# Patient Record
Sex: MALE | Race: WHITE | NOT HISPANIC OR LATINO
[De-identification: names, ages, dates, MRNs, and addresses within clinical notes are randomized per-mention and may not be internally consistent; named-entity substitution may affect disease eponyms.]

---

## 2017-04-20 ENCOUNTER — RECORDS - HEALTHEAST (OUTPATIENT)
Dept: ADMINISTRATIVE | Facility: OTHER | Age: 57
End: 2017-04-20

## 2017-04-21 ENCOUNTER — RECORDS - HEALTHEAST (OUTPATIENT)
Dept: ADMINISTRATIVE | Facility: OTHER | Age: 57
End: 2017-04-21

## 2017-05-04 ENCOUNTER — RECORDS - HEALTHEAST (OUTPATIENT)
Dept: LAB | Facility: CLINIC | Age: 57
End: 2017-05-04

## 2017-05-04 LAB
CHOLEST SERPL-MCNC: 149 MG/DL
FASTING STATUS PATIENT QL REPORTED: NO
HDLC SERPL-MCNC: 31 MG/DL
LDLC SERPL CALC-MCNC: 101 MG/DL
TRIGL SERPL-MCNC: 85 MG/DL

## 2018-12-21 ENCOUNTER — OFFICE VISIT - HEALTHEAST (OUTPATIENT)
Dept: FAMILY MEDICINE | Facility: CLINIC | Age: 58
End: 2018-12-21

## 2018-12-21 ENCOUNTER — COMMUNICATION - HEALTHEAST (OUTPATIENT)
Dept: TELEHEALTH | Facility: CLINIC | Age: 58
End: 2018-12-21

## 2018-12-21 DIAGNOSIS — Z76.89 ENCOUNTER TO ESTABLISH CARE: ICD-10-CM

## 2018-12-21 DIAGNOSIS — Z12.11 SCREEN FOR COLON CANCER: ICD-10-CM

## 2018-12-21 DIAGNOSIS — I10 BENIGN ESSENTIAL HYPERTENSION: ICD-10-CM

## 2019-06-19 ENCOUNTER — COMMUNICATION - HEALTHEAST (OUTPATIENT)
Dept: FAMILY MEDICINE | Facility: CLINIC | Age: 59
End: 2019-06-19

## 2019-06-19 DIAGNOSIS — I10 BENIGN ESSENTIAL HYPERTENSION: ICD-10-CM

## 2019-07-14 ENCOUNTER — COMMUNICATION - HEALTHEAST (OUTPATIENT)
Dept: FAMILY MEDICINE | Facility: CLINIC | Age: 59
End: 2019-07-14

## 2019-07-14 DIAGNOSIS — I10 BENIGN ESSENTIAL HYPERTENSION: ICD-10-CM

## 2019-07-22 ENCOUNTER — OFFICE VISIT - HEALTHEAST (OUTPATIENT)
Dept: FAMILY MEDICINE | Facility: CLINIC | Age: 59
End: 2019-07-22

## 2019-07-22 DIAGNOSIS — I10 BENIGN ESSENTIAL HYPERTENSION: ICD-10-CM

## 2020-07-28 ENCOUNTER — COMMUNICATION - HEALTHEAST (OUTPATIENT)
Dept: FAMILY MEDICINE | Facility: CLINIC | Age: 60
End: 2020-07-28

## 2020-07-28 DIAGNOSIS — I10 BENIGN ESSENTIAL HYPERTENSION: ICD-10-CM

## 2020-08-11 ENCOUNTER — COMMUNICATION - HEALTHEAST (OUTPATIENT)
Dept: FAMILY MEDICINE | Facility: CLINIC | Age: 60
End: 2020-08-11

## 2020-09-16 ENCOUNTER — COMMUNICATION - HEALTHEAST (OUTPATIENT)
Dept: FAMILY MEDICINE | Facility: CLINIC | Age: 60
End: 2020-09-16

## 2020-09-16 ENCOUNTER — OFFICE VISIT - HEALTHEAST (OUTPATIENT)
Dept: FAMILY MEDICINE | Facility: CLINIC | Age: 60
End: 2020-09-16

## 2020-09-16 DIAGNOSIS — E66.812 CLASS 2 OBESITY WITHOUT SERIOUS COMORBIDITY IN ADULT, UNSPECIFIED BMI, UNSPECIFIED OBESITY TYPE: ICD-10-CM

## 2020-09-16 DIAGNOSIS — I10 BENIGN ESSENTIAL HYPERTENSION: ICD-10-CM

## 2020-09-16 RX ORDER — HYDROCHLOROTHIAZIDE 25 MG/1
25 TABLET ORAL DAILY
Qty: 90 TABLET | Refills: 2 | Status: SHIPPED | OUTPATIENT
Start: 2020-09-16

## 2020-09-16 RX ORDER — LOSARTAN POTASSIUM 100 MG/1
100 TABLET ORAL DAILY
Qty: 90 TABLET | Refills: 2 | Status: SHIPPED | OUTPATIENT
Start: 2020-09-16

## 2020-09-16 ASSESSMENT — MIFFLIN-ST. JEOR: SCORE: 2048.23

## 2021-05-29 NOTE — TELEPHONE ENCOUNTER
Call placed to advise patient needs appointment. Was told by gentlemen that answered the phone it was the wrong number.    Call placed to pharmacy to request they have the patient contact the office.    Pastora Hendrix [facility] CMT

## 2021-05-29 NOTE — TELEPHONE ENCOUNTER
Left message to call back for: Melinda-voicemail not setup  Information to relay to patient:      Put in a 30-day refill of the medications which I had , into losartan as well as hydrochlorothiazide.  I think that he needs to be seen for follow-up.

## 2021-05-29 NOTE — TELEPHONE ENCOUNTER
Put in a 30-day refill of the medications which I had , into losartan as well as hydrochlorothiazide.  I think that he needs to be seen for follow-up.

## 2021-05-29 NOTE — TELEPHONE ENCOUNTER
Left message to call back for: Melinda-voicemail not setup-2nd attempt  Information to relay to patient:       Put in a 30-day refill of the medications which I had , into losartan as well as hydrochlorothiazide.  I think that he needs to be seen for follow-up.

## 2021-05-29 NOTE — TELEPHONE ENCOUNTER
Patient walked into clinic. States he has been stable on this medication for years. Declined to schedule appointment at this time. He is requesting a 90 day supply of the losartan and hctz to be sent to his pharmacy.    Pastora Hendrix, [facility] CMT

## 2021-05-30 NOTE — PROGRESS NOTES
ASSESSMENT:  1. Benign essential hypertension  - hydroCHLOROthiazide (HYDRODIURIL) 25 MG tablet; Take 1 tablet (25 mg total) by mouth daily. Please come in for follow up.  Dispense: 90 tablet; Refill: 2  - losartan (COZAAR) 100 MG tablet; Take 1 tablet (100 mg total) by mouth daily.  Dispense: 90 tablet; Refill: 2  - Basic Metabolic Panel; Future  The pressure looks stable and controlled at this time. Refilled the medication.OPrdered for lab to check his Kidney function.Want to do it when he has some money to. Informed of clinic payment scale if he needs.  Discussed follow up needs, will see him in 6 months or earlier as needed.    PLAN:  There are no Patient Instructions on file for this visit.    Orders Placed This Encounter   Procedures     Basic Metabolic Panel     Standing Status:   Future     Standing Expiration Date:   7/22/2020     Medications Discontinued During This Encounter   Medication Reason     losartan-hydrochlorothiazide (HYZAAR) 100-25 mg per tablet Alternate therapy     hydroCHLOROthiazide (HYDRODIURIL) 25 MG tablet Reorder     losartan (COZAAR) 100 MG tablet Reorder       Return in about 9 months (around 4/22/2020), or if symptoms worsen or fail to improve, for Recheck.      CHIEF COMPLAINT:  Chief Complaint   Patient presents with     Hypertension     Medication Refill     Medication Questions       HISTORY OF PRESENT ILLNESS:  Melinda is a 58 y.o. male presenting to the clinic today for follow-up of Hypertension. He is exercising but not scheduled but noted walking consistently and is adherent to low salt diet.  Blood pressure is well controlled at home. Cardiac symptoms none. Patient denies chest pain, dyspnea and fatigue.  Cardiovascular factors: hypertension and male gender. Use of agents associated with hypertension: none. History of target organ damage: none.    REVIEW OF SYSTEMS:   He denies any complaints. Feels well. Having problems with insurance coverage and has to pay out of  pocket.Looking for a job. All other systems are negative.    PFSH:  Reviewed, as below.    Social History     Tobacco Use   Smoking Status Never Smoker   Smokeless Tobacco Never Used   Tobacco Comment    Cigars rarely.       Family History   Problem Relation Age of Onset     Stroke Mother      Heart disease Father        Social History     Socioeconomic History     Marital status: Single     Spouse name: Not on file     Number of children: Not on file     Years of education: Not on file     Highest education level: Not on file   Occupational History     Not on file   Social Needs     Financial resource strain: Not on file     Food insecurity:     Worry: Not on file     Inability: Not on file     Transportation needs:     Medical: Not on file     Non-medical: Not on file   Tobacco Use     Smoking status: Never Smoker     Smokeless tobacco: Never Used     Tobacco comment: Cigars rarely.   Substance and Sexual Activity     Alcohol use: Yes     Frequency: Monthly or less     Comment: Very Little     Drug use: No     Sexual activity: Never   Lifestyle     Physical activity:     Days per week: Not on file     Minutes per session: Not on file     Stress: Not on file   Relationships     Social connections:     Talks on phone: Not on file     Gets together: Not on file     Attends Spiritism service: Not on file     Active member of club or organization: Not on file     Attends meetings of clubs or organizations: Not on file     Relationship status: Not on file     Intimate partner violence:     Fear of current or ex partner: Not on file     Emotionally abused: Not on file     Physically abused: Not on file     Forced sexual activity: Not on file   Other Topics Concern     Not on file   Social History Narrative     Not on file       Past Surgical History:   Procedure Laterality Date     KNEE ARTHROSCOPY Left      SINUSOTOMY         No Known Allergies    Active Ambulatory Problems     Diagnosis Date Noted     No Active  Ambulatory Problems     Resolved Ambulatory Problems     Diagnosis Date Noted     No Resolved Ambulatory Problems     Past Medical History:   Diagnosis Date     Hypertension        Current Outpatient Medications   Medication Sig Dispense Refill     hydroCHLOROthiazide (HYDRODIURIL) 25 MG tablet Take 1 tablet (25 mg total) by mouth daily. Please come in for follow up. 90 tablet 2     losartan (COZAAR) 100 MG tablet Take 1 tablet (100 mg total) by mouth daily. 90 tablet 2     No current facility-administered medications for this visit.        VITALS:  Vitals:    07/22/19 0837   BP: 132/86   Pulse: 77   SpO2: 95%   Weight: (!) 270 lb (122.5 kg)     Wt Readings from Last 3 Encounters:   07/22/19 (!) 270 lb (122.5 kg)   12/21/18 (!) 265 lb 4.8 oz (120.3 kg)     There is no height or weight on file to calculate BMI.    PHYSICAL EXAM:  General Appearance: Alert, cooperative, no distress, appears stated age  HEENT: Pupils are equal and reactive, extraocular motions is normal.  Oropharynx is moist.  Neck is supple no notable thyromegaly.  External ears are normal.  Lungs: Clear to auscultation bilaterally, respirations unlabored  Heart: Regular rate and rhythm, S1 and S2 normal, no murmur, rub, or gallop  Abdomen: Soft  Musculoskeletal: Normal range of motion. No joint swelling or deformity.   Neurologic:  Alert and oriented times 3. Cranial nerves II-XII intact.   Psychiatric: Normal mood and affect.    MEDICATIONS:  Current Outpatient Medications   Medication Sig Dispense Refill     hydroCHLOROthiazide (HYDRODIURIL) 25 MG tablet Take 1 tablet (25 mg total) by mouth daily. Please come in for follow up. 90 tablet 2     losartan (COZAAR) 100 MG tablet Take 1 tablet (100 mg total) by mouth daily. 90 tablet 2     No current facility-administered medications for this visit.

## 2021-05-30 NOTE — TELEPHONE ENCOUNTER
Unable to reach by phone. Letter sent to schedule appointment.  Pastora Hendrix [facility] CMT

## 2021-05-30 NOTE — TELEPHONE ENCOUNTER
Left message to call back for: Melinda-voicemail not setup-3rd attempt  Information to relay to patient:       Put in a 30-day refill of the medications which I had , into losartan as well as hydrochlorothiazide.  I think that he needs to be seen for follow-up.

## 2021-05-30 NOTE — TELEPHONE ENCOUNTER
RN cannot approve Refill Request    RN can NOT refill this medication Protocol failed and NO refill given. Last office visit: 12/21/2018 Aryan Cortes MD Last Physical: Visit date not found Last MTM visit: Visit date not found Last visit same specialty: 12/21/2018 Aryan Cortes MD.  Next visit within 3 mo: Visit date not found  Next physical within 3 mo: Visit date not found      Kylie John, [facility] Triage/Med Refill 7/14/2019    Requested Prescriptions   Pending Prescriptions Disp Refills     losartan (COZAAR) 100 MG tablet [Pharmacy Med Name: LOSARTAN POTASSIUM 100 MG TAB] 30 tablet 0     Sig: TAKE 1 TABLET BY MOUTH EVERY DAY       Angiotensin Receptor Blocker Protocol Failed - 7/14/2019  7:05 AM        Failed - Serum potassium within the past 12 months     No results found for: LN-POTASSIUM          Failed - Serum creatinine within the past 12 months     Creatinine   Date Value Ref Range Status   05/04/2017 0.82 0.70 - 1.30 mg/dL Final             Passed - PCP or prescribing provider visit in past 12 months       Last office visit with prescriber/PCP: 12/21/2018 Aryan Cortes MD OR same dept: 12/21/2018 Aryan Cortes MD OR same specialty: 12/21/2018 Aryan Cortes MD  Last physical: Visit date not found Last MTM visit: Visit date not found   Next visit within 3 mo: Visit date not found  Next physical within 3 mo: Visit date not found  Prescriber OR PCP: Aryan Cortes MD  Last diagnosis associated with med order: 1. Benign essential hypertension  - losartan (COZAAR) 100 MG tablet [Pharmacy Med Name: LOSARTAN POTASSIUM 100 MG TAB]; TAKE 1 TABLET BY MOUTH EVERY DAY  Dispense: 30 tablet; Refill: 0  - hydroCHLOROthiazide (HYDRODIURIL) 25 MG tablet [Pharmacy Med Name: HYDROCHLOROTHIAZIDE 25 MG TAB]; Take 1 tablet (25 mg total) by mouth daily. Please come in for follow up.  Dispense: 30 tablet; Refill: 0    If  protocol passes may refill for 12 months if within 3 months of last provider visit (or a total of 15 months).             Passed - Blood pressure filed in past 12 months     BP Readings from Last 1 Encounters:   12/21/18 138/88             hydroCHLOROthiazide (HYDRODIURIL) 25 MG tablet [Pharmacy Med Name: HYDROCHLOROTHIAZIDE 25 MG TAB] 30 tablet 0     Sig: TAKE 1 TABLET (25 MG TOTAL) BY MOUTH DAILY. PLEASE COME IN FOR FOLLOW UP.       Diuretics/Combination Diuretics Refill Protocol  Failed - 7/14/2019  7:05 AM        Failed - Serum Potassium in past 12 months      No results found for: LN-POTASSIUM          Failed - Serum Sodium in past 12 months      No results found for: LN-SODIUM          Failed - Serum Creatinine in past 12 months      Creatinine   Date Value Ref Range Status   05/04/2017 0.82 0.70 - 1.30 mg/dL Final             Passed - Visit with PCP or prescribing provider visit in past 12 months     Last office visit with prescriber/PCP: 12/21/2018 Aryan Cortes MD OR same dept: 12/21/2018 Aryan Cortes MD OR same specialty: 12/21/2018 Aryan Cortes MD  Last physical: Visit date not found Last MTM visit: Visit date not found   Next visit within 3 mo: Visit date not found  Next physical within 3 mo: Visit date not found  Prescriber OR PCP: Aryan Cortes MD  Last diagnosis associated with med order: 1. Benign essential hypertension  - losartan (COZAAR) 100 MG tablet [Pharmacy Med Name: LOSARTAN POTASSIUM 100 MG TAB]; TAKE 1 TABLET BY MOUTH EVERY DAY  Dispense: 30 tablet; Refill: 0  - hydroCHLOROthiazide (HYDRODIURIL) 25 MG tablet [Pharmacy Med Name: HYDROCHLOROTHIAZIDE 25 MG TAB]; Take 1 tablet (25 mg total) by mouth daily. Please come in for follow up.  Dispense: 30 tablet; Refill: 0    If protocol passes may refill for 12 months if within 3 months of last provider visit (or a total of 15 months).             Passed - Blood pressure on file in past 12  months     BP Readings from Last 1 Encounters:   12/21/18 138/88

## 2021-06-02 VITALS — WEIGHT: 265.3 LBS

## 2021-06-03 VITALS — WEIGHT: 270 LBS

## 2021-06-05 VITALS
OXYGEN SATURATION: 98 % | SYSTOLIC BLOOD PRESSURE: 130 MMHG | WEIGHT: 267 LBS | DIASTOLIC BLOOD PRESSURE: 80 MMHG | HEIGHT: 71 IN | BODY MASS INDEX: 37.38 KG/M2 | HEART RATE: 82 BPM

## 2021-06-10 NOTE — TELEPHONE ENCOUNTER
Refill request for medication: 7/28/2020  Last visit addressing this medication: 7/22/19  Follow up plan 6  months  Last refill on 7/22/19, quantity #90     Appointment: please adivse follow-up      JOHANNA Soares

## 2021-06-11 NOTE — TELEPHONE ENCOUNTER
FYI - Status Update  Who is Calling: [facility] Pharmacist  Update: Patient was seen today and was ordered the following medications:  hydroCHLOROthiazide (HYDRODIURIL) 25 MG tablet  90 tablet  2  9/16/2020      Sig - Route: Take 1 tablet (25 mg total) by mouth daily. Please come in for follow up. - Oral     Sent to pharmacy as: hydroCHLOROthiazide 25 mg tablet (HYDRODIURIL)     E-Prescribing Status: Sent to pharmacy (9/16/2020 10:55 AM CDT)       losartan (COZAAR) 100 MG tablet  90 tablet  2  9/16/2020      Sig - Route: Take 1 tablet (100 mg total) by mouth daily. - Oral     Sent to pharmacy as: losartan 100 mg tablet (COZAAR)     Notes to Pharmacy: Please disregard previous script for 30 pillS. Please dispense this script    The pharmacy navneet stated We did not get these medications request. The pharmacist took the medication orders verbally as patient was in pharmacy waiting.  Okay to leave a detailed message?:  Yes

## 2021-06-11 NOTE — PROGRESS NOTES
ASSESSMENT:  1. Benign essential hypertension  - Lipid Cascade; Future  - Comprehensive Metabolic Panel; Future  - hydroCHLOROthiazide (HYDRODIURIL) 25 MG tablet; Take 1 tablet (25 mg total) by mouth daily. Please come in for follow up.  Dispense: 90 tablet; Refill: 2  - losartan (COZAAR) 100 MG tablet; Take 1 tablet (100 mg total) by mouth daily.  Dispense: 90 tablet; Refill: 2    2. Class 2 obesity without serious comorbidity in adult, unspecified BMI, unspecified obesity type    PLAN:  I discussed with him his various needs.  It looks like he is a doing well regarding his blood pressure and it is not controlled at this point.  But we discussed health maintenance including getting his tetanus shot as well as colonoscopy.  He noted that he does not have insurance and cannot afford to do those at this time.  He will come in for a fasting lab which have been ordered, but I did encourage him to talk to the pharmacist when he is picking up his medication to see if the tetanus shot is going to be cheaper for him getting it there.  I tried to encourage him to make sure to get the shot because it is very important.  He noted that he is on his way to getting his insurance and will follow-up for his other needs.    Orders Placed This Encounter   Procedures     Lipid Cascade     Standing Status:   Future     Standing Expiration Date:   9/16/2021     Order Specific Question:   Fasting is required?     Answer:   Yes     Comprehensive Metabolic Panel     Standing Status:   Future     Standing Expiration Date:   9/16/2021     Medications Discontinued During This Encounter   Medication Reason     losartan (COZAAR) 100 MG tablet Reorder     hydroCHLOROthiazide (HYDRODIURIL) 25 MG tablet Reorder     losartan (COZAAR) 100 MG tablet Reorder       No follow-ups on file.      CHIEF COMPLAINT:  Chief Complaint   Patient presents with     Medication Management     Med Ck        HISTORY OF PRESENT ILLNESS:  Melinda is a 60 y.o. male  presenting to the clinic today  For medication check and follow up for Hypertension. Has been on Losartan and Hydrochlorothiazide for some time now. Has been taking his medication consistently. Started being a lot more active and rides his bike almost daily. Feels better and healthy and noted loss of weight. Feels his knee is better due to being active.     REVIEW OF SYSTEMS:   Noted feeling well. Has no chest pain.No shortness of breath and no swelling to the legs. Has no headaches and lightheadedness. All other systems are negative.    PFSH:  Reviewed, as below.    Social History     Tobacco Use   Smoking Status Never Smoker   Smokeless Tobacco Never Used   Tobacco Comment    Cigars rarely.       Family History   Problem Relation Age of Onset     Stroke Mother      Heart disease Father        Social History     Socioeconomic History     Marital status: Single     Spouse name: Not on file     Number of children: Not on file     Years of education: Not on file     Highest education level: Not on file   Occupational History     Not on file   Social Needs     Financial resource strain: Not on file     Food insecurity     Worry: Not on file     Inability: Not on file     Transportation needs     Medical: Not on file     Non-medical: Not on file   Tobacco Use     Smoking status: Never Smoker     Smokeless tobacco: Never Used     Tobacco comment: Cigars rarely.   Substance and Sexual Activity     Alcohol use: Yes     Frequency: Monthly or less     Comment: Very Little     Drug use: No     Sexual activity: Never   Lifestyle     Physical activity     Days per week: Not on file     Minutes per session: Not on file     Stress: Not on file   Relationships     Social connections     Talks on phone: Not on file     Gets together: Not on file     Attends Scientologist service: Not on file     Active member of club or organization: Not on file     Attends meetings of clubs or organizations: Not on file     Relationship status: Not on  "file     Intimate partner violence     Fear of current or ex partner: Not on file     Emotionally abused: Not on file     Physically abused: Not on file     Forced sexual activity: Not on file   Other Topics Concern     Not on file   Social History Narrative     Not on file       Past Surgical History:   Procedure Laterality Date     KNEE ARTHROSCOPY Left      SINUSOTOMY         No Known Allergies    Active Ambulatory Problems     Diagnosis Date Noted     No Active Ambulatory Problems     Resolved Ambulatory Problems     Diagnosis Date Noted     No Resolved Ambulatory Problems     Past Medical History:   Diagnosis Date     Hypertension        Current Outpatient Medications   Medication Sig Dispense Refill     losartan (COZAAR) 100 MG tablet Take 1 tablet (100 mg total) by mouth daily. 90 tablet 2     hydroCHLOROthiazide (HYDRODIURIL) 25 MG tablet Take 1 tablet (25 mg total) by mouth daily. Please come in for follow up. 90 tablet 2     No current facility-administered medications for this visit.        VITALS:  Vitals:    09/16/20 1032   BP: 130/80   Patient Site: Left Arm   Patient Position: Sitting   Cuff Size: Adult Large   Pulse: 82   SpO2: 98%   Weight: (!) 267 lb (121.1 kg)   Height: 5' 11\" (1.803 m)     Wt Readings from Last 3 Encounters:   09/16/20 (!) 267 lb (121.1 kg)   07/22/19 (!) 270 lb (122.5 kg)   12/21/18 (!) 265 lb 4.8 oz (120.3 kg)     Body mass index is 37.24 kg/m .    PHYSICAL EXAM:  General Appearance: Alert, cooperative, no distress, appears stated age  HEENT: Pupils are equal and reactive, extraocular motions is normal. Neck is supple no notable thyromegaly.  External ears are normal.  Lungs: Clear to auscultation bilaterally, respirations unlabored  Heart: Regular rhythm and normal rate,S1 and S2 normal.  Abdomen: Soft, non tender but obese.  Musculoskeletal: Normal range of motion. Wearing a brace on the left knee.No tenderness.  Neurologic:  Alert and oriented times 3.   Psychiatric: Normal " mood and affect.    MEDICATIONS:  Current Outpatient Medications   Medication Sig Dispense Refill     losartan (COZAAR) 100 MG tablet Take 1 tablet (100 mg total) by mouth daily. 90 tablet 2     hydroCHLOROthiazide (HYDRODIURIL) 25 MG tablet Take 1 tablet (25 mg total) by mouth daily. Please come in for follow up. 90 tablet 2     No current facility-administered medications for this visit.

## 2021-06-19 NOTE — LETTER
Letter by Pastora Hendrix CMA at      Author: Pastora Hendrix CMA Service: -- Author Type: --    Filed:  Encounter Date: 6/19/2019 Status: (Other)         07/03/19      Melinda Martin  [address] [place] [address]    Dear Melinda,    As a valued [facility] patient, your healthcare needs are our priority. Our clinic records indicate we have attempted to contact you to schedule a follow up appointment, but have not heard back from you after three (3) attempts. To prevent further delays in your care please contact our office to schedule your appointment as soon as possible.      Sincerely,    [facility] Staff

## 2021-06-22 NOTE — PROGRESS NOTES
ASSESSMENT:  1. Encounter to establish care    2. Benign essential hypertension  - losartan-hydrochlorothiazide (HYZAAR) 100-25 mg per tablet; Take 1 tablet by mouth daily.  Dispense: 90 tablet; Refill: 2    3. Screen for colon cancer    PLAN:  Discussed with the patient in the health maintenance and needs for him.  He does not have insurance currently and will want to wait for that.  Discussed Cologuard with him as well.  Medications were refilled for the hypertension.  Blood pressure check today at the same normal.  He does not have any known side effects from the medications at this time.  Did offer to get labs but he will want to wait until he comes in for a physical exam.  We did talk about weight loss and exercise and being active as a way to lessen the pressure and pain on the knee as well as the ankle.  Discussed the fact that he has a slight difficult swallowing food.  He noted having a history of gastroesophageal reflux in the past.  I encouraged him to call to let us know when he has insurance to be able to refer him to gastroenterology for an EGD as well as for colonoscopy.  He will come in for a full physical hopefully will discuss it further at that point.    No orders of the defined types were placed in this encounter.    Medications Discontinued During This Encounter   Medication Reason     losartan-hydrochlorothiazide (HYZAAR) 100-25 mg per tablet Reorder       No Follow-up on file.      CHIEF COMPLAINT:  Chief Complaint   Patient presents with     [facility]     Hypertension       HISTORY OF PRESENT ILLNESS:  Melinda is a 58 y.o. male presenting to the clinic today to establish care.  He also has a history of hypertension that he needs to have medication refills.  He had moved from [place] about 2-3 years ago unfortunately he is currently not working  so he does not have any insurance.  He went to be seen at a different clinic to have his medication refilled.  Otherwise he is not been seen.  We  reviewed his intake form, and his family and social history as well as his surgical history and past medical history all reviewed.  Is been having some aches and pain especially around the knee and around the ankle, noted some fatigue and inability to be very physically active.  Denies any dizziness, does have some headaches intermittently.  He sleeps well and denies having any snoring issues.  He does have questions regarding ways to improve his health.    REVIEW OF SYSTEMS:   Constitutional:Denied any and inability to no fevers no chills.  Has good appetite.  HEENT: Does not have any neck pain. Has some difficulty with swallowing solid food intermittently.  Respiratory: There is no cough.  No chest wall pain.  Cardiovascular: Denied chest pain shortness of breath or palpitations.  There is no notable lower extremity swelling.    Gastrointestinal: Denies nausea vomiting.  No abdominal pain no diarrhea or constipation.  Endocrine:Has no sensitivity to cold or heat.  Denied undue thirst.   Genitourinary:Has no urinary symptoms, no nocturia.  Musculoskeletal: As in the history.  Skin: He does not have any rashes.   Allergic/Immunologic: Negative.   Neurological: No Numbness  Hematological: Negative.   Psychiatric/Behavioral: No anxiety or depression symptoms.          PFSH:  Reviewed, as below.    Social History     Tobacco Use   Smoking Status Never Smoker   Smokeless Tobacco Never Used   Tobacco Comment    Cigars rarely.       Family History   Problem Relation Age of Onset     Stroke Mother      Heart disease Father        Social History     Socioeconomic History     Marital status: Single     Spouse name: Not on file     Number of children: Not on file     Years of education: Not on file     Highest education level: Not on file   Social Needs     Financial resource strain: Not on file     Food insecurity - worry: Not on file     Food insecurity - inability: Not on file     Transportation needs - medical: Not on  file     Transportation needs - non-medical: Not on file   Occupational History     Not on file   Tobacco Use     Smoking status: Never Smoker     Smokeless tobacco: Never Used     Tobacco comment: Cigars rarely.   Substance and Sexual Activity     Alcohol use: Yes     Frequency: Monthly or less     Comment: Very Little     Drug use: No     Sexual activity: No   Other Topics Concern     Not on file   Social History Narrative     Not on file       Past Surgical History:   Procedure Laterality Date     KNEE ARTHROSCOPY Left      SINUSOTOMY         No Known Allergies    Active Ambulatory Problems     Diagnosis Date Noted     No Active Ambulatory Problems     Resolved Ambulatory Problems     Diagnosis Date Noted     No Resolved Ambulatory Problems     Past Medical History:   Diagnosis Date     Hypertension        Current Outpatient Medications   Medication Sig Dispense Refill     losartan-hydrochlorothiazide (HYZAAR) 100-25 mg per tablet Take 1 tablet by mouth daily. 90 tablet 2     No current facility-administered medications for this visit.        VITALS:  Vitals:    12/21/18 1056   BP: 138/88   Pulse: 64   Resp: 16   Weight: (!) 265 lb 4.8 oz (120.3 kg)     Wt Readings from Last 3 Encounters:   12/21/18 (!) 265 lb 4.8 oz (120.3 kg)     There is no height or weight on file to calculate BMI.    PHYSICAL EXAM:  General Appearance: Alert, cooperative, no distress, appears stated age but obese.  HEENT: Pupils are equal and reactive, extraocular motions is normal.  Oropharynx is moist.  Neck is supple no notable thyromegaly.  External ears are normal.TMs are normal.  Lungs: Clear to auscultation bilaterally, respirations unlabored  Heart: Regular rate and rhythm, S1 and S2 normal.  Abdomen: Soft but obese. No palpable organs or masses.  Skin: Normal  Musculoskeletal: Normal range of motion. No joint swelling or deformity.   Neurologic:  Alert and oriented times 3.Cranial nerves II-XII intact.   Psychiatric: Normal mood and  affect.    MEDICATIONS:  Current Outpatient Medications   Medication Sig Dispense Refill     losartan-hydrochlorothiazide (HYZAAR) 100-25 mg per tablet Take 1 tablet by mouth daily. 90 tablet 2     No current facility-administered medications for this visit.